# Patient Record
Sex: FEMALE | Race: WHITE | NOT HISPANIC OR LATINO | URBAN - METROPOLITAN AREA
[De-identification: names, ages, dates, MRNs, and addresses within clinical notes are randomized per-mention and may not be internally consistent; named-entity substitution may affect disease eponyms.]

---

## 2024-03-05 ENCOUNTER — OV NP (OUTPATIENT)
Dept: URBAN - METROPOLITAN AREA CLINIC 78 | Facility: CLINIC | Age: 69
End: 2024-03-05
Payer: MEDICARE

## 2024-03-05 VITALS
DIASTOLIC BLOOD PRESSURE: 76 MMHG | TEMPERATURE: 98.1 F | HEART RATE: 70 BPM | WEIGHT: 189.2 LBS | SYSTOLIC BLOOD PRESSURE: 134 MMHG | BODY MASS INDEX: 29.7 KG/M2 | RESPIRATION RATE: 14 BRPM | HEIGHT: 67 IN

## 2024-03-05 DIAGNOSIS — R12 HEARTBURN: ICD-10-CM

## 2024-03-05 DIAGNOSIS — Z83.719 FAMILY HISTORY OF COLONIC POLYPS: ICD-10-CM

## 2024-03-05 DIAGNOSIS — R10.13 DYSPEPSIA: ICD-10-CM

## 2024-03-05 DIAGNOSIS — R13.19 ESOPHAGEAL DYSPHAGIA: ICD-10-CM

## 2024-03-05 PROCEDURE — 99244 OFF/OP CNSLTJ NEW/EST MOD 40: CPT | Performed by: INTERNAL MEDICINE

## 2024-03-05 PROCEDURE — 99204 OFFICE O/P NEW MOD 45 MIN: CPT | Performed by: INTERNAL MEDICINE

## 2024-03-05 RX ORDER — SODIUM PICOSULFATE, MAGNESIUM OXIDE, AND ANHYDROUS CITRIC ACID 12; 3.5; 1 G/175ML; G/175ML; MG/175ML
175 ML THE FIRST DOSE THE EVENING BEFORE AND SECOND DOSE THE MORNING OF COLONOSCOPY LIQUID ORAL ONCE A DAY
Qty: 350 | OUTPATIENT
Start: 2024-03-05 | End: 2024-03-07

## 2024-03-05 NOTE — HPI-TODAY'S VISIT:
The patient presents on referral from Marta Murphy MD, for heartburn. A copy of this note will be sent to the referring physician.   She has experienced heartburn on and off. She will use Prilosec or Nexium prn with fair results.Symotoms are present both during the daytime and nighttime. She tends to have dinner at 6:30 and will go to bed at 10:00. She has also noted a feeling of a "gas bubble" that feels as though she is having a heart attack. This pain will wake her up from sleep and will resolve if she used TUMS/AklkaSeltzer or drinks a carbonated beverage. These episodes tend to occur out of the blue. She has also noted a pain in the LUQ, right underneath her ribcage, occurring intermittently.  Lately she has noted dysphagia occurring more often lately, mainly to solids.  The patient had a previous colonoscopy ~2006 and again in 2016. Neither revealed significant findings. The patient otherwise does not have a FH of esoph/gastric/colon cancer, a few of her siblings have had colon polyps diagnosed in the past few years.  There is no recent history of rectal bleeding. The patient has no pertinent additional complaints of abdominal pain, constipation, diarrhea, bloating, narrow stools, and weight loss. She attributes that because of recent changes in her diet, her stools might be a bit softer in consistency.   Regarding any upper GI complaints, the patient has not had nausea, vomiting, and dysphagia. She does have heartburn intermittently. She also report s aremote history of H pylori infection, s/p completion of antibiotic therapy.   The patient does not take medications such as any blood thinners. She is on a baby ASA daily. She denies any CP or JHAVERI. She walks about 3 miles 4-5 times a week.   Summary of prior workup: - Colonoscopy by me on 10/5/2016: Normal terminal ileum, few sigmoid diverticuli, 3 mm  lymphoid aggregate in the rectum.  Quality of the prep was good.  A repeat colonoscopy was advised in 10 years

## 2024-03-05 NOTE — PHYSICAL EXAM NEUROLOGIC:
oriented to person, place and time , normal sensation , short and long term memory intact Unable to Assess

## 2024-03-06 ENCOUNTER — LAB (OUTPATIENT)
Dept: URBAN - METROPOLITAN AREA CLINIC 4 | Facility: CLINIC | Age: 69
End: 2024-03-06
Payer: MEDICARE

## 2024-03-06 ENCOUNTER — EGD W/ DIL (OUTPATIENT)
Dept: URBAN - METROPOLITAN AREA SURGERY CENTER 15 | Facility: SURGERY CENTER | Age: 69
End: 2024-03-06
Payer: MEDICARE

## 2024-03-06 DIAGNOSIS — T47.8X5A ADVERSE EFFECT OF OTHER AGENTS PRIMARILY AFFECTING GASTROINTESTINAL SYSTEM, INITIAL ENCOUNTER: ICD-10-CM

## 2024-03-06 DIAGNOSIS — R12 HEARTBURN: ICD-10-CM

## 2024-03-06 DIAGNOSIS — K31.7 BENIGN GASTRIC POLYP: ICD-10-CM

## 2024-03-06 DIAGNOSIS — K21.00 GASTRO-ESOPHAGEAL REFLUX DISEASE WITH ESOPHAGITIS: ICD-10-CM

## 2024-03-06 DIAGNOSIS — R10.12 ABDOMINAL PAIN, LEFT UPPER QUADRANT: ICD-10-CM

## 2024-03-06 DIAGNOSIS — R13.19 DYSPHAGIA: ICD-10-CM

## 2024-03-06 DIAGNOSIS — K29.80 PEPTIC DUODENITIS: ICD-10-CM

## 2024-03-06 DIAGNOSIS — K29.81 DUODENITIS WITH BLEEDING: ICD-10-CM

## 2024-03-06 DIAGNOSIS — K31.89 OTHER DISEASES OF STOMACH AND DUODENUM: ICD-10-CM

## 2024-03-06 PROCEDURE — 88305 TISSUE EXAM BY PATHOLOGIST: CPT | Performed by: PATHOLOGY

## 2024-03-06 PROCEDURE — 43249 ESOPH EGD DILATION <30 MM: CPT | Performed by: INTERNAL MEDICINE

## 2024-03-06 PROCEDURE — 43239 EGD BIOPSY SINGLE/MULTIPLE: CPT | Performed by: INTERNAL MEDICINE

## 2024-03-06 PROCEDURE — 88312 SPECIAL STAINS GROUP 1: CPT | Performed by: PATHOLOGY

## 2024-03-06 RX ORDER — SODIUM PICOSULFATE, MAGNESIUM OXIDE, AND ANHYDROUS CITRIC ACID 12; 3.5; 1 G/175ML; G/175ML; MG/175ML
175 ML THE FIRST DOSE THE EVENING BEFORE AND SECOND DOSE THE MORNING OF COLONOSCOPY LIQUID ORAL ONCE A DAY
Qty: 350 | Status: ACTIVE | COMMUNITY
Start: 2024-03-05 | End: 2024-03-07

## 2024-03-06 RX ORDER — OMEPRAZOLE 40 MG/1
1 CAPSULE 30 MINUTES BEFORE MORNING MEAL AND 30 MINUTES BEFORE DINNER CAPSULE, DELAYED RELEASE ORAL TWICE DAILY
Qty: 60 | Refills: 2 | OUTPATIENT
Start: 2024-03-06

## 2024-04-17 ENCOUNTER — COLON (OUTPATIENT)
Dept: URBAN - METROPOLITAN AREA SURGERY CENTER 15 | Facility: SURGERY CENTER | Age: 69
End: 2024-04-17
Payer: MEDICARE

## 2024-04-17 DIAGNOSIS — Z12.11 ENCOUNTER FOR SCREENING FOR MALIGNANT NEOPLASM OF COLON: ICD-10-CM

## 2024-04-17 DIAGNOSIS — Z83.719 FAMILY HISTORY OF COLON POLYPS, UNSPECIFIED: ICD-10-CM

## 2024-04-17 PROCEDURE — G0105 COLORECTAL SCRN; HI RISK IND: HCPCS | Performed by: INTERNAL MEDICINE

## 2024-04-17 RX ORDER — OMEPRAZOLE 40 MG/1
1 CAPSULE 30 MINUTES BEFORE MORNING MEAL AND 30 MINUTES BEFORE DINNER CAPSULE, DELAYED RELEASE ORAL TWICE DAILY
Qty: 60 | Refills: 2 | Status: ACTIVE | COMMUNITY
Start: 2024-03-06